# Patient Record
Sex: MALE | Race: WHITE | RURAL
[De-identification: names, ages, dates, MRNs, and addresses within clinical notes are randomized per-mention and may not be internally consistent; named-entity substitution may affect disease eponyms.]

---

## 2017-08-08 ENCOUNTER — OFFICE VISIT (OUTPATIENT)
Dept: FAMILY MEDICINE CLINIC | Age: 77
End: 2017-08-08

## 2017-08-08 VITALS
WEIGHT: 195 LBS | HEIGHT: 68 IN | SYSTOLIC BLOOD PRESSURE: 165 MMHG | DIASTOLIC BLOOD PRESSURE: 74 MMHG | HEART RATE: 68 BPM | RESPIRATION RATE: 20 BRPM | TEMPERATURE: 97.3 F | BODY MASS INDEX: 29.55 KG/M2 | OXYGEN SATURATION: 98 %

## 2017-08-08 DIAGNOSIS — R20.2 INTERMITTENT TINGLING SENSATION OF RIGHT HAND AND FOOT: ICD-10-CM

## 2017-08-08 DIAGNOSIS — Z13.29 THYROID DISORDER SCREEN: ICD-10-CM

## 2017-08-08 DIAGNOSIS — Z13.220 SCREENING FOR LIPID DISORDERS: ICD-10-CM

## 2017-08-08 DIAGNOSIS — I10 ESSENTIAL HYPERTENSION: Primary | ICD-10-CM

## 2017-08-08 LAB
BILIRUB UR QL STRIP: NEGATIVE
GLUCOSE UR-MCNC: NEGATIVE MG/DL
KETONES P FAST UR STRIP-MCNC: NEGATIVE MG/DL
PH UR STRIP: 7.5 [PH] (ref 4.6–8)
PROT UR QL STRIP: NEGATIVE MG/DL
SP GR UR STRIP: 1.02 (ref 1–1.03)
UA UROBILINOGEN AMB POC: NORMAL (ref 0.2–1)
URINALYSIS CLARITY POC: CLEAR
URINALYSIS COLOR POC: YELLOW
URINE BLOOD POC: NEGATIVE
URINE LEUKOCYTES POC: NEGATIVE
URINE NITRITES POC: NEGATIVE

## 2017-08-08 RX ORDER — LANOLIN ALCOHOL/MO/W.PET/CERES
2500 CREAM (GRAM) TOPICAL DAILY
COMMUNITY
End: 2017-08-31

## 2017-08-08 RX ORDER — OMEPRAZOLE 20 MG/1
CAPSULE, DELAYED RELEASE ORAL
Refills: 1 | COMMUNITY
Start: 2017-06-23

## 2017-08-08 RX ORDER — ATENOLOL 50 MG/1
TABLET ORAL
Refills: 1 | COMMUNITY
Start: 2017-05-23

## 2017-08-08 RX ORDER — CHOLECALCIFEROL (VITAMIN D3) 125 MCG
100 CAPSULE ORAL DAILY
COMMUNITY

## 2017-08-08 RX ORDER — GARLIC 1000 MG
CAPSULE ORAL
COMMUNITY
End: 2017-08-31

## 2017-08-08 RX ORDER — MULTIVITAMIN
TABLET ORAL
COMMUNITY
End: 2017-08-31

## 2017-08-08 RX ORDER — BISMUTH SUBSALICYLATE 262 MG
1 TABLET,CHEWABLE ORAL DAILY
COMMUNITY

## 2017-08-08 RX ORDER — TRIAMTERENE AND HYDROCHLOROTHIAZIDE 75; 50 MG/1; MG/1
1 TABLET ORAL DAILY
Qty: 90 TAB | Refills: 0 | Status: SHIPPED | OUTPATIENT
Start: 2017-08-08 | End: 2017-10-10 | Stop reason: SDUPTHER

## 2017-08-08 RX ORDER — ASPIRIN 81 MG/1
TABLET ORAL DAILY
COMMUNITY

## 2017-08-08 RX ORDER — TRIAMTERENE AND HYDROCHLOROTHIAZIDE 75; 50 MG/1; MG/1
TABLET ORAL
Refills: 1 | COMMUNITY
Start: 2017-05-23 | End: 2017-08-08 | Stop reason: SDUPTHER

## 2017-08-08 RX ORDER — ASCORBIC ACID 500 MG
1000 TABLET ORAL DAILY
COMMUNITY
End: 2017-08-31

## 2017-08-08 NOTE — PROGRESS NOTES
Valeria Tariq is a 68 y.o. male who presents to the office today with the following:  Chief Complaint   Patient presents with    Tingling     R-sided, x 1 week, numb sensation       HPI  Numbness right thumb/finger and right toes x 5 days. Mother's side family had high risk stroke and she just passed this June due to stroke. However, has been 5 days now so he is no longer concerned of stroke. Thinks may have pinched a nerve. Reports sitting in \"zero gravity\" chair for sometime on beach, when got out of it noticed tingling in right pinky and thumb, like was asleep. Also noticed same sensation in all toes of right foot. Has gradually improved since and currently has mostly resolved. Noticed positional influence, if leans back can sometimes feel again and if sits straight fwd gets better. He admits his right 2nd digit has felt tingly for about 1 year. Hx old wrist injury, put hand through glass, cut through wrist damaging nerves. Had numbness in right pinky/thumb for over a year, could not  well, dropped drinks constantly. Resolved after time and has really had no issues until recently. Denies any new injury. No skin changes, redness/warmth/swelling. Currently has no n/t/w/s. Also admits to previous low back sx, reports two titanium rods in low bad from spinal stenosis and slipped disc. No pain/probs since. No other LE sxs and toe n/t has resolved. He says he is mainly here today as he has not had any labs in 10 years. Recently returned from living in [facility]. Going back but unsure when, taking care of mother's estate and brother, not sure how long. Goes annually for physical but always declines labs. Wants cholesterol and sugar. .etc all checked now. Is fasting today. Admits to hx HTN  130-140/70s at home. HR 55-60s. Taking half Atenolol 50 mg and half Maxzide 75-50 mg daily. No recent HAs, CP, SOB, or TIA sxs. No Gu or GI sxs.   Otherwise feeling well with no other complaints or acute concerns. Says would like any recommendations/tx while here, as he is not sure when he will return to PCP in [facility]. Review of Systems   Constitutional: Negative for fever and malaise/fatigue. HENT: Negative for hearing loss and tinnitus. Eyes: Negative. Respiratory: Negative for cough, shortness of breath and wheezing. Cardiovascular: Negative for chest pain, palpitations, orthopnea, claudication, leg swelling and PND. Gastrointestinal: Negative. Genitourinary: Negative. Musculoskeletal: Negative for myalgias. Skin: Negative for rash. Neurological: Positive for tingling and sensory change. Negative for dizziness, tremors, speech change, focal weakness, seizures, loss of consciousness and headaches. See HPI. No Known Allergies    Current Outpatient Prescriptions   Medication Sig    atenolol (TENORMIN) 50 mg tablet TAKE 1/2 TABLET BY MOUTH DAILY    omeprazole (PRILOSEC) 20 mg capsule TAKE 1 CAPSULE BY MOUTH EVERY MORNING    multivitamin (ONE A DAY) tablet Take 1 Tab by mouth daily.  aspirin delayed-release 81 mg tablet Take  by mouth daily.  cyanocobalamin 1,000 mcg tablet Take 2,500 mcg by mouth daily.  potassium 99 mg tablet Take 99 mg by mouth daily.  garlic 1,035 mg cap Take  by mouth.  co-enzyme Q-10 (CO Q-10) 100 mg capsule Take 100 mg by mouth daily.  cinnamon bark (CINNAMON) 500 mg cap Take  by mouth.  CHIP ROOT, BULK, 550 mg by Does Not Apply route.  cranberry extract 450 mg tab tablet Take 4,200 mg by mouth two (2) times a day.  ascorbic acid, vitamin C, (VITAMIN C) 500 mg tablet Take 1,000 mg by mouth daily.  Omega-3 Fatty Acids 60- mg cpDR Take  by mouth.  triamterene-hydroCHLOROthiazide (MAXZIDE) 75-50 mg per tablet Take 1 Tab by mouth daily. No current facility-administered medications for this visit.         Past Medical History:   Diagnosis Date    Hypertension        Past Surgical History:   Procedure Laterality Date    HX ORTHOPAEDIC  2007    spinal stenosis, anita placed to hold disc in place. Social History     Social History    Marital status:      Spouse name: N/A    Number of children: N/A    Years of education: N/A     Social History Main Topics    Smoking status: Never Smoker    Smokeless tobacco: None    Alcohol use No      Comment: stopped 7/2010    Drug use: No    Sexual activity: No     Other Topics Concern    None     Social History Narrative    None       Family History   Problem Relation Age of Onset    Stroke Mother     COPD Father          Physical Exam:  Visit Vitals    /74 (BP 1 Location: Left arm, BP Patient Position: Sitting)    Pulse 68    Temp 97.3 °F (36.3 °C) (Temporal)    Resp 20    Ht 5' 8\" (1.727 m)    Wt 195 lb (88.5 kg)    SpO2 98%    BMI 29.65 kg/m2     Physical Exam   Constitutional: He is oriented to person, place, and time and well-developed, well-nourished, and in no distress. HENT:   Head: Normocephalic and atraumatic. Right Ear: External ear normal.   Left Ear: External ear normal.   Nose: Nose normal.   Mouth/Throat: Oropharynx is clear and moist.   Eyes: Conjunctivae and EOM are normal. Pupils are equal, round, and reactive to light. Neck: Normal range of motion. Neck supple. No thyromegaly present. Cardiovascular: Normal rate, regular rhythm, normal heart sounds, intact distal pulses and normal pulses. No murmur heard. Pulmonary/Chest: Effort normal and breath sounds normal.   Abdominal: Soft. There is no tenderness. Musculoskeletal: Normal range of motion. He exhibits no edema. Lymphadenopathy:     He has no cervical adenopathy. Neurological: He is alert and oriented to person, place, and time. He has normal motor skills, normal sensation, normal strength, normal reflexes and intact cranial nerves.  He has a normal Straight Leg Raise Test, a normal Cerebellar Exam, a normal Finger-Nose-Finger Test and a normal Tandem Gait Test. He shows no pronator drift. Gait normal.   Skin: Skin is warm and dry. Psychiatric: Mood and affect normal.   Nursing note and vitals reviewed. Assessment/Plan:    ICD-10-CM ICD-9-CM    1. Essential hypertension I10 401.9 atenolol (TENORMIN) 50 mg tablet      omeprazole (PRILOSEC) 20 mg capsule      multivitamin (ONE A DAY) tablet      aspirin delayed-release 81 mg tablet      cyanocobalamin 1,000 mcg tablet      potassium 99 mg tablet      garlic 2,884 mg cap      co-enzyme Q-10 (CO Q-10) 100 mg capsule      cinnamon bark (CINNAMON) 500 mg cap      CHIP ROOT, BULK,      cranberry extract 450 mg tab tablet      ascorbic acid, vitamin C, (VITAMIN C) 500 mg tablet      Omega-3 Fatty Acids 60- mg cpDR      triamterene-hydroCHLOROthiazide (MAXZIDE) 75-50 mg per tablet      AMB POC EKG ROUTINE W/ 12 LEADS, INTER & REP      CBC WITH AUTOMATED DIFF      METABOLIC PANEL, COMPREHENSIVE      LIPID PANEL      TSH 3RD GENERATION      AMB POC URINALYSIS DIP STICK MANUAL W/O MICRO      MO HANDLG&/OR CONVEY OF SPEC FOR TR OFFICE TO LAB      MO COLLECTION VENOUS BLOOD,VENIPUNCTURE   2. Screening for lipid disorders Z13.220 V77.91 LIPID PANEL   3. Thyroid disorder screen Z13.29 V77.0 TSH 3RD GENERATION   4.  Intermittent tingling sensation of right hand and foot R20.2 782.0      Results for orders placed or performed in visit on 08/08/17   AMB POC URINALYSIS DIP STICK MANUAL W/O MICRO   Result Value Ref Range    Color (UA POC) Yellow Yellow    Clarity (UA POC) Clear Clear    Glucose (UA POC) Negative Negative    Bilirubin (UA POC) Negative Negative    Ketones (UA POC) Negative Negative    Specific gravity (UA POC) 1.020 1.001 - 1.035    Blood (UA POC) Negative Negative    pH (UA POC) 7.5 4.6 - 8.0    Protein (UA POC) Negative Negative mg/dL    Urobilinogen (UA POC) 0.2 mg/dL 0.2 - 1    Nitrites (UA POC) Negative Negative    Leukocyte esterase (UA POC) Negative Negative     EKG sinus ehsan with 1st deg AV block, no other abnls.  Will continue baby ASA. Discussed other supplements. Will take whole tablet Maxzide, monitor BP, and rtc for f/u in 2-3 wks. With RFs, family hx and pt concerns offered referral to cardiology but he declines. Counseled on red flag sxs- Neuro exam in office wnl today, but pt agrees to seek immediate care/to ER for any alarm sxs in the future. Will call with labs and help pt manage conditions until he is able to return to see PCP in [place]. Follow-up Disposition:  Return in about 2 weeks (around 8/22/2017), or sooner prn, for fu BP after increasing med.     Shayne Sharma PA-C

## 2017-08-08 NOTE — PATIENT INSTRUCTIONS
DASH Diet: Care Instructions  Your Care Instructions  The DASH diet is an eating plan that can help lower your blood pressure. DASH stands for Dietary Approaches to Stop Hypertension. Hypertension is high blood pressure. The DASH diet focuses on eating foods that are high in calcium, potassium, and magnesium. These nutrients can lower blood pressure. The foods that are highest in these nutrients are fruits, vegetables, low-fat dairy products, nuts, seeds, and legumes. But taking calcium, potassium, and magnesium supplements instead of eating foods that are high in those nutrients does not have the same effect. The DASH diet also includes whole grains, fish, and poultry. The DASH diet is one of several lifestyle changes your doctor may recommend to lower your high blood pressure. Your doctor may also want you to decrease the amount of sodium in your diet. Lowering sodium while following the DASH diet can lower blood pressure even further than just the DASH diet alone. Follow-up care is a key part of your treatment and safety. Be sure to make and go to all appointments, and call your doctor if you are having problems. It's also a good idea to know your test results and keep a list of the medicines you take. How can you care for yourself at home? Following the DASH diet  · Eat 4 to 5 servings of fruit each day. A serving is 1 medium-sized piece of fruit, ½ cup chopped or canned fruit, 1/4 cup dried fruit, or 4 ounces (½ cup) of fruit juice. Choose fruit more often than fruit juice. · Eat 4 to 5 servings of vegetables each day. A serving is 1 cup of lettuce or raw leafy vegetables, ½ cup of chopped or cooked vegetables, or 4 ounces (½ cup) of vegetable juice. Choose vegetables more often than vegetable juice. · Get 2 to 3 servings of low-fat and fat-free dairy each day. A serving is 8 ounces of milk, 1 cup of yogurt, or 1 ½ ounces of cheese. · Eat 6 to 8 servings of grains each day.  A serving is 1 slice of bread, 1 ounce of dry cereal, or ½ cup of cooked rice, pasta, or cooked cereal. Try to choose whole-grain products as much as possible. · Limit lean meat, poultry, and fish to 2 servings each day. A serving is 3 ounces, about the size of a deck of cards. · Eat 4 to 5 servings of nuts, seeds, and legumes (cooked dried beans, lentils, and split peas) each week. A serving is 1/3 cup of nuts, 2 tablespoons of seeds, or ½ cup of cooked beans or peas. · Limit fats and oils to 2 to 3 servings each day. A serving is 1 teaspoon of vegetable oil or 2 tablespoons of salad dressing. · Limit sweets and added sugars to 5 servings or less a week. A serving is 1 tablespoon jelly or jam, ½ cup sorbet, or 1 cup of lemonade. · Eat less than 2,300 milligrams (mg) of sodium a day. If you limit your sodium to 1,500 mg a day, you can lower your blood pressure even more. Tips for success  · Start small. Do not try to make dramatic changes to your diet all at once. You might feel that you are missing out on your favorite foods and then be more likely to not follow the plan. Make small changes, and stick with them. Once those changes become habit, add a few more changes. · Try some of the following:  ¨ Make it a goal to eat a fruit or vegetable at every meal and at snacks. This will make it easy to get the recommended amount of fruits and vegetables each day. ¨ Try yogurt topped with fruit and nuts for a snack or healthy dessert. ¨ Add lettuce, tomato, cucumber, and onion to sandwiches. ¨ Combine a ready-made pizza crust with low-fat mozzarella cheese and lots of vegetable toppings. Try using tomatoes, squash, spinach, broccoli, carrots, cauliflower, and onions. ¨ Have a variety of cut-up vegetables with a low-fat dip as an appetizer instead of chips and dip. ¨ Sprinkle sunflower seeds or chopped almonds over salads. Or try adding chopped walnuts or almonds to cooked vegetables. ¨ Try some vegetarian meals using beans and peas. Add garbanzo or kidney beans to salads. Make burritos and tacos with mashed loving beans or black beans. Where can you learn more? Go to http://onel-jason.info/. Enter T928 in the search box to learn more about \"DASH Diet: Care Instructions. \"  Current as of: April 3, 2017  Content Version: 11.3  © 1383-1808 [employer]. Care instructions adapted under license by [employer] (which disclaims liability or warranty for this information). If you have questions about a medical condition or this instruction, always ask your healthcare professional. [place] [address] any warranty or liability for your use of this information. Learning About High Cholesterol  What is high cholesterol? Cholesterol is a type of fat in your blood. It is needed for many body functions, such as making new cells. Cholesterol is made by your body. It also comes from food you eat. If you have too much cholesterol, it starts to build up in your arteries. This is called hardening of the arteries, or atherosclerosis. High cholesterol raises your risk of a heart attack and stroke. There are different types of cholesterol. LDL is the \"bad\" cholesterol. High LDL can raise your risk for heart disease, heart attack, and stroke. HDL is the \"good\" cholesterol. High HDL is linked with a lower risk for heart disease, heart attack, and stroke. Your cholesterol levels help your doctor find out your risk for having a heart attack or stroke. How can you prevent high cholesterol? A heart-healthy lifestyle can help you prevent high cholesterol. This lifestyle helps lower your risk for a heart attack and stroke. · Eat heart-healthy foods. ¨ Eat fruits, vegetables, whole grains (like oatmeal), dried beans and peas, nuts and seeds, soy products (like tofu), and fat-free or low-fat dairy products.   ¨ Replace butter, margarine, and hydrogenated or partially hydrogenated oils with olive and canola oils. (Canola oil margarine without trans fat is fine.)  ¨ Replace red meat with fish, poultry, and soy protein (like tofu). ¨ Limit processed and packaged foods like chips, crackers, and cookies. · Be active. Exercise can improve your cholesterol level. Get at least 30 minutes of exercise on most days of the week. Walking is a good choice. You also may want to do other activities, such as running, swimming, cycling, or playing tennis or team sports. · Stay at a healthy weight. Lose weight if you need to. · Don't smoke. If you need help quitting, talk to your doctor about stop-smoking programs and medicines. These can increase your chances of quitting for good. How is high cholesterol treated? The goal of treatment is to reduce your chances of having a heart attack or stroke. The goal is not to lower your cholesterol numbers only. · You may make lifestyle changes, such as eating healthy foods, not smoking, losing weight, and being more active. · You may have to take medicine. Follow-up care is a key part of your treatment and safety. Be sure to make and go to all appointments, and call your doctor if you are having problems. It's also a good idea to know your test results and keep a list of the medicines you take. Where can you learn more? Go to http://onel-jason.info/. Enter X223 in the search box to learn more about \"Learning About High Cholesterol. \"  Current as of: April 3, 2017  Content Version: 11.3  © 6554-7143 [employer]. Care instructions adapted under license by [employer] (which disclaims liability or warranty for this information). If you have questions about a medical condition or this instruction, always ask your healthcare professional. [place] [address] any warranty or liability for your use of this information.

## 2017-08-09 LAB
ALBUMIN SERPL-MCNC: 4.6 G/DL (ref 3.5–4.8)
ALBUMIN/GLOB SERPL: 2.1 {RATIO} (ref 1.2–2.2)
ALP SERPL-CCNC: 60 IU/L (ref 39–117)
ALT SERPL-CCNC: 15 IU/L (ref 0–44)
AST SERPL-CCNC: 19 IU/L (ref 0–40)
BASOPHILS # BLD AUTO: 0 X10E3/UL (ref 0–0.2)
BASOPHILS NFR BLD AUTO: 0 %
BILIRUB SERPL-MCNC: 1.1 MG/DL (ref 0–1.2)
BUN SERPL-MCNC: 21 MG/DL (ref 8–27)
BUN/CREAT SERPL: 18 (ref 10–24)
CALCIUM SERPL-MCNC: 10 MG/DL (ref 8.6–10.2)
CHLORIDE SERPL-SCNC: 98 MMOL/L (ref 96–106)
CHOLEST SERPL-MCNC: 188 MG/DL (ref 100–199)
CO2 SERPL-SCNC: 24 MMOL/L (ref 18–29)
CREAT SERPL-MCNC: 1.15 MG/DL (ref 0.76–1.27)
EOSINOPHIL # BLD AUTO: 0.1 X10E3/UL (ref 0–0.4)
EOSINOPHIL NFR BLD AUTO: 1 %
ERYTHROCYTE [DISTWIDTH] IN BLOOD BY AUTOMATED COUNT: 13.1 % (ref 12.3–15.4)
GLOBULIN SER CALC-MCNC: 2.2 G/DL (ref 1.5–4.5)
GLUCOSE SERPL-MCNC: 94 MG/DL (ref 65–99)
HCT VFR BLD AUTO: 46.3 % (ref 37.5–51)
HDLC SERPL-MCNC: 49 MG/DL
HGB BLD-MCNC: 15.8 G/DL (ref 12.6–17.7)
IMM GRANULOCYTES # BLD: 0 X10E3/UL (ref 0–0.1)
IMM GRANULOCYTES NFR BLD: 0 %
INTERPRETATION, 910389: NORMAL
LDLC SERPL CALC-MCNC: 107 MG/DL (ref 0–99)
LYMPHOCYTES # BLD AUTO: 2 X10E3/UL (ref 0.7–3.1)
LYMPHOCYTES NFR BLD AUTO: 25 %
MCH RBC QN AUTO: 30.3 PG (ref 26.6–33)
MCHC RBC AUTO-ENTMCNC: 34.1 G/DL (ref 31.5–35.7)
MCV RBC AUTO: 89 FL (ref 79–97)
MONOCYTES # BLD AUTO: 0.5 X10E3/UL (ref 0.1–0.9)
MONOCYTES NFR BLD AUTO: 7 %
NEUTROPHILS # BLD AUTO: 5.2 X10E3/UL (ref 1.4–7)
NEUTROPHILS NFR BLD AUTO: 67 %
PLATELET # BLD AUTO: 217 X10E3/UL (ref 150–379)
POTASSIUM SERPL-SCNC: 4.6 MMOL/L (ref 3.5–5.2)
PROT SERPL-MCNC: 6.8 G/DL (ref 6–8.5)
RBC # BLD AUTO: 5.22 X10E6/UL (ref 4.14–5.8)
SODIUM SERPL-SCNC: 142 MMOL/L (ref 134–144)
TRIGL SERPL-MCNC: 160 MG/DL (ref 0–149)
TSH SERPL DL<=0.005 MIU/L-ACNC: 1.04 UIU/ML (ref 0.45–4.5)
VLDLC SERPL CALC-MCNC: 32 MG/DL (ref 5–40)
WBC # BLD AUTO: 7.8 X10E3/UL (ref 3.4–10.8)

## 2017-08-09 NOTE — PROGRESS NOTES
Notify pt all labs came back wnl except his trig and chol are a little elev. Should work on avoiding trans/sat fats in diet, exercise regularly, and may cont omega-3 fish oils/eat \"healthy fats\" in diet.

## 2017-08-31 ENCOUNTER — OFFICE VISIT (OUTPATIENT)
Dept: FAMILY MEDICINE CLINIC | Age: 77
End: 2017-08-31

## 2017-08-31 VITALS
SYSTOLIC BLOOD PRESSURE: 162 MMHG | TEMPERATURE: 97.5 F | OXYGEN SATURATION: 98 % | HEART RATE: 51 BPM | RESPIRATION RATE: 16 BRPM | WEIGHT: 195.2 LBS | BODY MASS INDEX: 29.68 KG/M2 | DIASTOLIC BLOOD PRESSURE: 80 MMHG

## 2017-08-31 DIAGNOSIS — I10 ESSENTIAL HYPERTENSION: Primary | ICD-10-CM

## 2017-08-31 DIAGNOSIS — Z00.00 MEDICARE ANNUAL WELLNESS VISIT, INITIAL: ICD-10-CM

## 2017-08-31 NOTE — ACP (ADVANCE CARE PLANNING)
Advance Care Planning (ACP) Provider Conversation Snapshot    Date of ACP Conversation: 08/31/17  Persons included in Conversation:  patient  Length of ACP Conversation in minutes:  <16 minutes (Non-Billable)    Pt has ACP at home

## 2017-08-31 NOTE — PATIENT INSTRUCTIONS
Schedule of Personalized Health Plan  (Provide Copy to Patient)  The best way to stay healthy is to live a healthy lifestyle. A healthy lifestyle includes regular exercise, eating a well-balanced diet, keeping a healthy weight and not smoking. Regular physical exams and screening tests are another important way to take care of yourself. Preventive exams provided by health care providers can find health problems early when treatment works best and can keep you from getting certain diseases or illnesses. Preventive services include exams, lab tests, screenings, shots, monitoring and information to help you take care of your own health. All people over 65 should have a pneumonia shot. Pneumonia shots are usually only needed once in a lifetime unless your doctor decides differently. All people over 65 should have a yearly flu shot. People over 65 are at medium to high risk for Hepatitis B. Three shots are needed for complete protection. In addition to your physical exam, some screening tests are recommended:    Bone mass measurement (dexa scan) is recommended every two years if you have certain risk factors, such as personal history of vertebral fracture or chronic steroid medication use    Diabetes Mellitus screening is recommended every year. Glaucoma is an eye disease caused by high pressure in the eye. An eye exam is recommended every year. Cardiovascular screening tests that check your cholesterol and other blood fat (lipid) levels are recommended every five years. Colorectal Cancer screening tests help to find pre-cancerous polyps (growths in the colon) so they can be removed before they turn into cancer. Tests ordered for screening depend on your personal and family history risk factors.     Screening for Prostate Cancer is recommended yearly with a digital rectal exam and/or a PSA test    Here is a list of your current Health Maintenance items with a due date:  Health Maintenance Topic Date Due    DTaP/Tdap/Td series (1 - Tdap) 06/13/1961    ZOSTER VACCINE AGE 60>  04/13/2000    GLAUCOMA SCREENING Q2Y  06/13/2005    Pneumococcal 65+ Low/Medium Risk (1 of 2 - PCV13) 06/13/2005    MEDICARE YEARLY EXAM  06/13/2005    INFLUENZA AGE 9 TO ADULT  08/01/2017

## 2017-08-31 NOTE — PROGRESS NOTES
Chief Complaint   Patient presents with   [facility] Wellness Visit            Samanta Xiao is a 68 y.o. male and presents for annual Medicare Wellness Visit. Problem List: Reviewed with patient and discussed risk factors. Patient Active Problem List   Diagnosis Code    Hypertension I10       Current medical providers:  Patient Care Team:  Gypsy Gamez MD as PCP - General    PSH: Reviewed with patient  Past Surgical History:   Procedure Laterality Date    HX ORTHOPAEDIC  2007    spinal stenosis, anita placed to hold disc in place. SH: Reviewed with patient  Social History   Substance Use Topics    Smoking status: Never Smoker    Smokeless tobacco: Never Used    Alcohol use No      Comment: stopped 7/2010       FH: Reviewed with patient  Family History   Problem Relation Age of Onset    Stroke Mother     COPD Father        Medications/Allergies: Reviewed with patient  Current Outpatient Prescriptions on File Prior to Visit   Medication Sig Dispense Refill    atenolol (TENORMIN) 50 mg tablet TAKE 1/2 TABLET BY MOUTH DAILY  1    omeprazole (PRILOSEC) 20 mg capsule TAKE 1 CAPSULE BY MOUTH EVERY MORNING  1    multivitamin (ONE A DAY) tablet Take 1 Tab by mouth daily.  aspirin delayed-release 81 mg tablet Take  by mouth daily.  co-enzyme Q-10 (CO Q-10) 100 mg capsule Take 100 mg by mouth daily.  Omega-3 Fatty Acids 60- mg cpDR Take  by mouth.  triamterene-hydroCHLOROthiazide (MAXZIDE) 75-50 mg per tablet Take 1 Tab by mouth daily. 90 Tab 0    cyanocobalamin 1,000 mcg tablet Take 2,500 mcg by mouth daily.  potassium 99 mg tablet Take 99 mg by mouth daily.  garlic 9,964 mg cap Take  by mouth.  cinnamon bark (CINNAMON) 500 mg cap Take  by mouth.  CHIP ROOT, BULK, 550 mg by Does Not Apply route.  cranberry extract 450 mg tab tablet Take 4,200 mg by mouth two (2) times a day.       ascorbic acid, vitamin C, (VITAMIN C) 500 mg tablet Take 1,000 mg by mouth daily. No current facility-administered medications on file prior to visit. No Known Allergies    Objective:  Visit Vitals    /70 (BP 1 Location: Left arm, BP Patient Position: Sitting)    Pulse (!) 51    Temp 97.5 °F (36.4 °C) (Oral)    Resp 16    Wt 195 lb 3.2 oz (88.5 kg)    SpO2 98%    BMI 29.68 kg/m2    Body mass index is 29.68 kg/(m^2). Assessment of cognitive impairment: Alert and oriented x 3    Depression Screen:   PHQ over the last two weeks 8/31/2017   Little interest or pleasure in doing things Not at all   Feeling down, depressed or hopeless Not at all   Total Score PHQ 2 0       Fall Risk Assessment:    Fall Risk Assessment, last 12 mths 8/31/2017   Able to walk? Yes   Fall in past 12 months? No       Functional Ability:   Does the patient exhibit a steady gait? yes   How long did it take the patient to get up and walk from a sitting position? 2sec   Is the patient self reliant?  (ie can do own laundry, meals, household chores)  yes     Does the patient handle his/her own medications? yes     Does the patient handle his/her own money? yes     Is the patients home safe (ie good lighting, handrails on stairs and bath, etc.)? yes     Did you notice or did patient express any hearing difficulties? yes     Did you notice or did patient express any vision difficulties?   no     Were distance and reading eye charts used? no       Advance Care Planning:   Patient was offered the opportunity to discuss advance care planning:  yes     Does patient have an Advance Directive:  yes   If no, did you provide information on Caring Connections? yes       Plan:      No orders of the defined types were placed in this encounter.       Health Maintenance   Topic Date Due    DTaP/Tdap/Td series (1 - Tdap) 06/13/1961    ZOSTER VACCINE AGE 60>  04/13/2000    GLAUCOMA SCREENING Q2Y  06/13/2005    Pneumococcal 65+ Low/Medium Risk (1 of 2 - PCV13) 06/13/2005    MEDICARE YEARLY EXAM  06/13/2005    INFLUENZA AGE 9 TO ADULT  08/01/2017       *Patient verbalized understanding and agreement with the plan. A copy of the After Visit Summary with personalized health plan was given to the patient today.       Pt has not taken meds yet today; has ACP, will bring in    Eligio Mitchell LPN

## 2017-08-31 NOTE — PROGRESS NOTES
HISTORY OF PRESENT ILLNESS  Marshall Calhoun is a 68 y.o. male. Chief Complaint   Patient presents with    Annual Wellness Visit            HPI  HTN  On Atenolol 25  And Maxide, 75/50  No SE  BP at home 136-148/60's  Mostly around 140    Anxiety  Mother  and has to settle estate and find a place for brother  Lives in [facility]  And BP good there    Tingling went away    HM reviewed    Review of Systems   Constitutional: Negative for malaise/fatigue. Respiratory: Negative for cough and shortness of breath. Cardiovascular: Negative for chest pain. Neurological: Negative for dizziness. Psychiatric/Behavioral: The patient is nervous/anxious. Past Medical History:   Diagnosis Date    Hypertension      Current Outpatient Prescriptions   Medication Sig Dispense Refill    atenolol (TENORMIN) 50 mg tablet TAKE 1/2 TABLET BY MOUTH DAILY  1    omeprazole (PRILOSEC) 20 mg capsule TAKE 1 CAPSULE BY MOUTH EVERY MORNING  1    multivitamin (ONE A DAY) tablet Take 1 Tab by mouth daily.  aspirin delayed-release 81 mg tablet Take  by mouth daily.  co-enzyme Q-10 (CO Q-10) 100 mg capsule Take 100 mg by mouth daily.  Omega-3 Fatty Acids 60- mg cpDR Take  by mouth.  triamterene-hydroCHLOROthiazide (MAXZIDE) 75-50 mg per tablet Take 1 Tab by mouth daily. 90 Tab 0     No Known Allergies  Visit Vitals    /70 (BP 1 Location: Left arm, BP Patient Position: Sitting)    Pulse (!) 51    Temp 97.5 °F (36.4 °C) (Oral)    Resp 16    Wt 195 lb 3.2 oz (88.5 kg)    SpO2 98%    BMI 29.68 kg/m2     Physical Exam   Constitutional: He is oriented to person, place, and time. He appears well-developed and well-nourished. No distress. HENT:   Head: Normocephalic and atraumatic. Eyes: Conjunctivae and EOM are normal.   Cardiovascular: Normal rate and regular rhythm. Pulmonary/Chest: Effort normal and breath sounds normal.   Musculoskeletal: He exhibits no edema.    Neurological: He is alert and oriented to person, place, and time. Skin: Skin is warm and dry. Psychiatric: He has a normal mood and affect. Mildly agitated   Nursing note and vitals reviewed. ASSESSMENT and PLAN    ICD-10-CM ICD-9-CM    1. Essential hypertension I10 401.9    2.  Medicare annual wellness visit, initial Z00.00 V70.0    cont current meds and recheck with PCP in [place]

## 2017-10-10 DIAGNOSIS — I10 ESSENTIAL HYPERTENSION: ICD-10-CM

## 2017-10-10 RX ORDER — TRIAMTERENE AND HYDROCHLOROTHIAZIDE 75; 50 MG/1; MG/1
1 TABLET ORAL DAILY
Qty: 90 TAB | Refills: 0 | Status: SHIPPED | OUTPATIENT
Start: 2017-10-10

## 2017-10-10 NOTE — TELEPHONE ENCOUNTER
Pt calls for a refill of his   Requested Prescriptions     Pending Prescriptions Disp Refills    triamterene-hydroCHLOROthiazide (MAXZIDE) 75-50 mg per tablet 90 Tab 0     Sig: Take 1 Tab by mouth daily. He will be leaving later than he thought for [place] and will runs out before he goes.